# Patient Record
Sex: MALE | Race: WHITE | NOT HISPANIC OR LATINO
[De-identification: names, ages, dates, MRNs, and addresses within clinical notes are randomized per-mention and may not be internally consistent; named-entity substitution may affect disease eponyms.]

---

## 2024-02-13 PROBLEM — Z00.00 ENCOUNTER FOR PREVENTIVE HEALTH EXAMINATION: Status: ACTIVE | Noted: 2024-02-13

## 2024-02-20 ENCOUNTER — APPOINTMENT (OUTPATIENT)
Dept: ORTHOPEDIC SURGERY | Facility: CLINIC | Age: 74
End: 2024-02-20

## 2024-02-20 ENCOUNTER — APPOINTMENT (OUTPATIENT)
Dept: ORTHOPEDIC SURGERY | Facility: CLINIC | Age: 74
End: 2024-02-20
Payer: MEDICARE

## 2024-02-20 DIAGNOSIS — M47.814 SPONDYLOSIS W/OUT MYELOPATHY OR RADICULOPATHY, THORACIC REGION: ICD-10-CM

## 2024-02-20 DIAGNOSIS — S46.919A STRAIN OF UNSPECIFIED MUSCLE, FASCIA AND TENDON AT SHOULDER AND UPPER ARM LEVEL, UNSPECIFIED ARM, INITIAL ENCOUNTER: ICD-10-CM

## 2024-02-20 DIAGNOSIS — M47.816 SPONDYLOSIS W/OUT MYELOPATHY OR RADICULOPATHY, LUMBAR REGION: ICD-10-CM

## 2024-02-20 DIAGNOSIS — Z78.9 OTHER SPECIFIED HEALTH STATUS: ICD-10-CM

## 2024-02-20 PROCEDURE — 72070 X-RAY EXAM THORAC SPINE 2VWS: CPT

## 2024-02-20 PROCEDURE — 99203 OFFICE O/P NEW LOW 30 MIN: CPT

## 2024-02-20 NOTE — IMAGING
[Bilateral] : rib bilaterally [No bony abnormalities] : No bony abnormalities [No spinal deformity, fracture, lytic lesion, or marked single level collapse] : No spinal deformity, fracture, lytic lesion, or marked single level collapse [There are no fractures, subluxations or dislocations. No significant abnormalities are seen] : There are no fractures, subluxations or dislocations. No significant abnormalities are seen [de-identified] : T Spine Inspection: No defects or deformities Palpation: No tenderness or spasms in b/l thoracic paraspinal musculature ROM: Full with stiffness Neuro: Sensation LT I

## 2024-02-20 NOTE — HISTORY OF PRESENT ILLNESS
[Mid-back] : mid-back [8] : 8 [0] : 0 [Dull/Aching] : dull/aching [Constant] : constant [Nothing helps with pain getting better] : Nothing helps with pain getting better [de-identified] : 02/20/24: 73 M is here for mid back pain that began about 3 months ago. There was no inciting injury. This is his first evaluation. Pain is intermittent, he notices it when he starts to run while playing softball. Pain stops when he stops running. He has used Advil occasionally. Denies numbness, tingling, radiation, bowel or bladder dysfunction, saddle anesthesia, prior injury. He works one day a week sitting.  [] : no [[email or web address]] : 74 yo M presenting wit mid back pain that started 6 weeks ago w/o known injury. Pain when running.

## 2024-03-14 ENCOUNTER — APPOINTMENT (OUTPATIENT)
Dept: ORTHOPEDIC SURGERY | Facility: CLINIC | Age: 74
End: 2024-03-14

## 2024-03-28 ENCOUNTER — APPOINTMENT (OUTPATIENT)
Dept: ORTHOPEDIC SURGERY | Facility: CLINIC | Age: 74
End: 2024-03-28